# Patient Record
(demographics unavailable — no encounter records)

---

## 2025-05-23 NOTE — END OF VISIT
[FreeTextEntry3] : All medical record entries made by the scribe were at my, OFELIA THOMASON, direction and personally dictated by me on 5/23/2025. I have reviewed the chart and agree that the record accurately reflects my personal performance of the history, physical exam, assessment, and plan. I have also personally directed, reviewed, and agreed with the chart.

## 2025-05-23 NOTE — ASSESSMENT
[FreeTextEntry1] : 82-year-old male with intermittent right thigh and calf claudication.  On follow-up his walking distance and exercise tolerance has remained stable. His noninvasive imaging is also stable. There is increase in his right PITO as compared to the last study. We again discussed the indication for interventional or surgical treatment, which include major or minor tissue loss, ischemic rest pain or lifestyle limiting claudication.  He is encouraged that his exercise ability has actually improved. We will continue best medical treatment with daily walking and exercise, meticulous foot care, continue cilostazol, and continue excellent risk factor control.  He has done very well with best medical management and daily exercise and walking, and he can walk long distances and perform vigorous lower extremity exercises without the onset of symptoms.  Given his excellent clinical progression, we will continue to perform yearly follow-up.  He is well aware of early onset claudication symptoms, and if has any of these or any tissue loss, will see me soon as possible.  Otherwise, we will repeat PVR in 1 year after which he will follow-up.

## 2025-05-23 NOTE — DATA REVIEWED
[FreeTextEntry1] : PITO/PVR testing- personally reviewed- Right: moderate occlusive disease 0.57, Left: Normal 1.16

## 2025-05-23 NOTE — PHYSICAL EXAM
[Normal Breath Sounds] : Normal breath sounds [0] : right 0 [2+] : left 2+ [JVD] : no jugular venous distention  [de-identified] : NAD [de-identified] : NCAT [de-identified] : Supple [de-identified] : No skin ulcers or tissue loss

## 2025-05-23 NOTE — REVIEW OF SYSTEMS
[Leg Claudication] : intermittent leg claudication [Limb Pain] : limb pain [Fever] : no fever [Chills] : no chills [Lower Ext Edema] : no extremity edema [Limb Swelling] : no limb swelling

## 2025-05-23 NOTE — HISTORY OF PRESENT ILLNESS
[FreeTextEntry1] : 77 y/o M referred by Dr. Swanson for vascular evaluation.  He reports right thigh pain with exercising or doing "step ups". These symptoms have been going on for several months. He reports that the symptoms can be more pronounced when he is going uphill. He is quite active, exercises daily.  No hx of CVA, TIA. He has a hx of CAD, he is s/p PCI.     Interval hx:  11/20/20 - He has been compliant with the exercise regimen as instructed.  He reports that he is able to walk for longer distances before the onset of thigh as well as calf pain.  He is also started cilostazol, which she is tolerating well.   3/24/21 - He continues to do quite well.  He reports that he has been compliant with daily structured exercise and walking regimen.  He can walk outdoors for 30+ minutes without the onset of thigh or calf pain.  He underwent repeat noninvasive physiological testing, here to discuss.   4/1/22 - He continues to do very well.  Walking most days for long distances.  Walked 4 miles in [facility] without having to stop last weekend.  Denies the onset of thigh or calf pain with ambulation.  Underwent arterial testing.   10/21/22 - He is doing quite well.  He had an eventful course during May of this year.  He was undergoing coronary catheterization at [facility], this revealed disease pattern which would require CABG, however due to his prior aortic valve replacement, decision was made to perform PCI.  While awaiting PCI, he developed pneumonia.  This resolved, he ultimately did undergo coronary intervention which was successful.  He did well post procedure.  Reports that he is ambulating daily, long distances.  He pushes himself up to very long and steep hills, when he does develop thigh and calf pain, however he is able to ambulate through the pain.  Feels that his legs are doing well.   4/12/23 - He continues to do well.  Exercising frequently, ambulating daily, long distances without the onset of thigh or calf pain.  Happy with results.  Underwent PVR.   4/19/24 - He continues to do very well.  Exercising and ambulating daily.  On rare occasions he develops right calf pain, but usually not.  Cardiovascular risk factors well-controlled.  Underwent PVR.    [de-identified] : 5/23/25- 82 year old male who continues to do very well.  Exercising and ambulating daily.  On rare occasions he develops right calf pain when walking uphills or with heavy lifting, but usually not.  Cardiovascular risk factors well-controlled.  Underwent PVR testing and is here to discuss the results and additional treatment options. The patient is on Eliquis for Afib and was instructed to stop the aspirin by his cardiologist.